# Patient Record
Sex: MALE | URBAN - METROPOLITAN AREA
[De-identification: names, ages, dates, MRNs, and addresses within clinical notes are randomized per-mention and may not be internally consistent; named-entity substitution may affect disease eponyms.]

---

## 2022-06-02 ENCOUNTER — OFFICE VISIT (OUTPATIENT)
Dept: URBAN - METROPOLITAN AREA CLINIC 8 | Facility: CLINIC | Age: 37
End: 2022-06-02
Payer: COMMERCIAL

## 2022-06-02 DIAGNOSIS — Q12.0 CONGENITAL CATARACT: Primary | ICD-10-CM

## 2022-06-02 PROCEDURE — 92025 CPTRIZED CORNEAL TOPOGRAPHY: CPT | Performed by: OPHTHALMOLOGY

## 2022-06-02 PROCEDURE — 92134 CPTRZ OPH DX IMG PST SGM RTA: CPT | Performed by: OPHTHALMOLOGY

## 2022-06-02 PROCEDURE — 99204 OFFICE O/P NEW MOD 45 MIN: CPT | Performed by: OPHTHALMOLOGY

## 2022-06-02 PROCEDURE — 92136 OPHTHALMIC BIOMETRY: CPT | Performed by: OPHTHALMOLOGY

## 2022-06-02 ASSESSMENT — PACHYMETRY
OS: 22.15
OD: 3.43
OS: 2.99
OD: 23.10

## 2022-06-02 ASSESSMENT — INTRAOCULAR PRESSURE
OD: 15
OS: 15

## 2022-06-02 NOTE — IMPRESSION/PLAN
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 Plan: Patient desires standard cataract surgery with a standard IOL. The patient understands they will need eyeglasses for some or all activities. The risks, benefits and alternatives to surgery were discussed with the patient at length. The patient desires to proceed with cataract surgery, Toric lens, Target - plano Patient interested in IOL Lens exchange OS, Toric lens Imprimis drops

## 2022-06-20 ENCOUNTER — Encounter (OUTPATIENT)
Dept: URBAN - METROPOLITAN AREA EXTERNAL CLINIC 14 | Facility: EXTERNAL CLINIC | Age: 37
End: 2022-06-20
Payer: COMMERCIAL

## 2022-06-20 PROCEDURE — 66984 XCAPSL CTRC RMVL W/O ECP: CPT | Performed by: OPHTHALMOLOGY

## 2022-06-21 ENCOUNTER — POST-OPERATIVE VISIT (OUTPATIENT)
Dept: URBAN - METROPOLITAN AREA CLINIC 8 | Facility: CLINIC | Age: 37
End: 2022-06-21
Payer: COMMERCIAL

## 2022-06-21 DIAGNOSIS — Z48.810 ENCOUNTER FOR SURGICAL AFTERCARE FOLLOWING SURGERY ON A SENSE ORGAN: Primary | ICD-10-CM

## 2022-06-21 PROCEDURE — 99024 POSTOP FOLLOW-UP VISIT: CPT | Performed by: OPHTHALMOLOGY

## 2022-06-21 ASSESSMENT — INTRAOCULAR PRESSURE: OD: 16

## 2022-06-21 NOTE — IMPRESSION/PLAN
Impression: S/P PC IOL/SN6AT7 +19.5 OD - 1 Day. Encounter for surgical aftercare following surgery on a sense organ  Z48.810.  Plan: Post operative instructions reviewed - Condition is improving - --Continue Pred-Moxi-Nepaf

## 2022-06-28 ENCOUNTER — POST-OPERATIVE VISIT (OUTPATIENT)
Dept: URBAN - METROPOLITAN AREA CLINIC 8 | Facility: CLINIC | Age: 37
End: 2022-06-28
Payer: COMMERCIAL

## 2022-06-28 DIAGNOSIS — Z48.810 ENCOUNTER FOR SURGICAL AFTERCARE FOLLOWING SURGERY ON A SENSE ORGAN: Primary | ICD-10-CM

## 2022-06-28 PROCEDURE — 99024 POSTOP FOLLOW-UP VISIT: CPT | Performed by: OPHTHALMOLOGY

## 2022-06-28 ASSESSMENT — INTRAOCULAR PRESSURE: OD: 17

## 2022-06-28 ASSESSMENT — VISUAL ACUITY
OD: 20/20
OS: 20/25

## 2022-06-28 NOTE — IMPRESSION/PLAN
Impression: S/P PC IOL/SN6AT7 +19.5 OD - 8 Days. Encounter for surgical aftercare following surgery on a sense organ  Z48.810.  Plan: Post operative instructions reviewed - Condition is improving - --Continue Pred-Moxi-Nepaf

## 2022-07-19 ENCOUNTER — POST-OPERATIVE VISIT (OUTPATIENT)
Dept: URBAN - METROPOLITAN AREA CLINIC 8 | Facility: CLINIC | Age: 37
End: 2022-07-19
Payer: COMMERCIAL

## 2022-07-19 DIAGNOSIS — Z48.810 ENCOUNTER FOR SURGICAL AFTERCARE FOLLOWING SURGERY ON A SENSE ORGAN: Primary | ICD-10-CM

## 2022-07-19 PROCEDURE — 99024 POSTOP FOLLOW-UP VISIT: CPT | Performed by: OPHTHALMOLOGY

## 2022-07-19 ASSESSMENT — VISUAL ACUITY
OS: 20/25
OD: 20/20

## 2022-07-19 NOTE — IMPRESSION/PLAN
Impression: S/P PC IOL/SN6AT7 +19.5 OD - 29 Days. Encounter for surgical aftercare following surgery on a sense organ  Z48.810.  Plan: Post operative instructions reviewed - Condition is improving - --Taper Pred-Moxi-Nepaf as directed